# Patient Record
Sex: MALE | Race: OTHER | HISPANIC OR LATINO | URBAN - METROPOLITAN AREA
[De-identification: names, ages, dates, MRNs, and addresses within clinical notes are randomized per-mention and may not be internally consistent; named-entity substitution may affect disease eponyms.]

---

## 2019-11-16 ENCOUNTER — HOSPITAL ENCOUNTER (INPATIENT)
Facility: HOSPITAL | Age: 24
LOS: 1 days | Discharge: HOME | DRG: 208 | End: 2019-11-16
Attending: EMERGENCY MEDICINE | Admitting: EMERGENCY MEDICINE

## 2019-11-16 ENCOUNTER — APPOINTMENT (EMERGENCY)
Dept: RADIOLOGY | Facility: HOSPITAL | Age: 24
DRG: 208 | End: 2019-11-16

## 2019-11-16 VITALS
OXYGEN SATURATION: 99 % | DIASTOLIC BLOOD PRESSURE: 61 MMHG | SYSTOLIC BLOOD PRESSURE: 118 MMHG | HEIGHT: 72 IN | WEIGHT: 140 LBS | TEMPERATURE: 98.8 F | HEART RATE: 88 BPM | RESPIRATION RATE: 11 BRPM | BODY MASS INDEX: 18.96 KG/M2

## 2019-11-16 DIAGNOSIS — R56.9 WITNESSED SEIZURE-LIKE ACTIVITY (HCC): ICD-10-CM

## 2019-11-16 DIAGNOSIS — J96.02 ACUTE RESPIRATORY FAILURE WITH HYPERCAPNIA (HCC): Primary | ICD-10-CM

## 2019-11-16 DIAGNOSIS — F10.929 ACUTE ALCOHOL INTOXICATION (HCC): ICD-10-CM

## 2019-11-16 DIAGNOSIS — E87.4 METABOLIC ACIDOSIS WITH RESPIRATORY ACIDOSIS: ICD-10-CM

## 2019-11-16 DIAGNOSIS — R40.1 OBTUNDATION: ICD-10-CM

## 2019-11-16 PROBLEM — J96.01 ACUTE RESPIRATORY FAILURE WITH HYPOXIA AND HYPERCAPNIA (HCC): Status: RESOLVED | Noted: 2019-11-16 | Resolved: 2019-11-16

## 2019-11-16 PROBLEM — T68.XXXA HYPOTHERMIA: Status: RESOLVED | Noted: 2019-11-16 | Resolved: 2019-11-16

## 2019-11-16 PROBLEM — G93.40 ENCEPHALOPATHY ACUTE: Status: RESOLVED | Noted: 2019-11-16 | Resolved: 2019-11-16

## 2019-11-16 PROBLEM — J96.01 ACUTE RESPIRATORY FAILURE WITH HYPOXIA AND HYPERCAPNIA (HCC): Status: ACTIVE | Noted: 2019-11-16

## 2019-11-16 PROBLEM — G93.40 ENCEPHALOPATHY ACUTE: Status: ACTIVE | Noted: 2019-11-16

## 2019-11-16 PROBLEM — T68.XXXA HYPOTHERMIA: Status: ACTIVE | Noted: 2019-11-16

## 2019-11-16 LAB
ALBUMIN SERPL BCP-MCNC: 4.7 G/DL (ref 3.5–5)
ALP SERPL-CCNC: 106 U/L (ref 46–116)
ALT SERPL W P-5'-P-CCNC: 29 U/L (ref 12–78)
AMMONIA PLAS-SCNC: 41 UMOL/L (ref 11–35)
AMPHETAMINES SERPL QL SCN: NEGATIVE
ANION GAP SERPL CALCULATED.3IONS-SCNC: 7 MMOL/L (ref 4–13)
AST SERPL W P-5'-P-CCNC: 22 U/L (ref 5–45)
ATRIAL RATE: 90 BPM
ATRIAL RATE: 96 BPM
BARBITURATES UR QL: NEGATIVE
BASE EX.OXY STD BLDV CALC-SCNC: 73.9 % (ref 60–80)
BASE EX.OXY STD BLDV CALC-SCNC: 84.6 % (ref 60–80)
BASE EX.OXY STD BLDV CALC-SCNC: 86.1 % (ref 60–80)
BASE EXCESS BLDV CALC-SCNC: -2.7 MMOL/L
BASE EXCESS BLDV CALC-SCNC: -4.7 MMOL/L
BASE EXCESS BLDV CALC-SCNC: -5.5 MMOL/L
BASOPHILS # BLD AUTO: 0.02 THOUSANDS/ΜL (ref 0–0.1)
BASOPHILS NFR BLD AUTO: 0 % (ref 0–1)
BENZODIAZ UR QL: POSITIVE
BILIRUB SERPL-MCNC: 0.25 MG/DL (ref 0.2–1)
BILIRUB UR QL STRIP: NEGATIVE
BUN SERPL-MCNC: 12 MG/DL (ref 5–25)
CALCIUM SERPL-MCNC: 8.2 MG/DL (ref 8.3–10.1)
CHLORIDE SERPL-SCNC: 109 MMOL/L (ref 100–108)
CLARITY UR: NORMAL
CLARITY, POC: CLEAR
CO2 SERPL-SCNC: 26 MMOL/L (ref 21–32)
COCAINE UR QL: NEGATIVE
COLOR UR: YELLOW
COLOR, POC: YELLOW
CREAT SERPL-MCNC: 1.09 MG/DL (ref 0.6–1.3)
EOSINOPHIL # BLD AUTO: 0.01 THOUSAND/ΜL (ref 0–0.61)
EOSINOPHIL NFR BLD AUTO: 0 % (ref 0–6)
ERYTHROCYTE [DISTWIDTH] IN BLOOD BY AUTOMATED COUNT: 12.2 % (ref 11.6–15.1)
ETHANOL SERPL-MCNC: 257 MG/DL (ref 0–3)
GFR SERPL CREATININE-BSD FRML MDRD: 93 ML/MIN/1.73SQ M
GLUCOSE SERPL-MCNC: 120 MG/DL (ref 65–140)
GLUCOSE UR STRIP-MCNC: NEGATIVE MG/DL
HCO3 BLDV-SCNC: 22.1 MMOL/L (ref 24–30)
HCO3 BLDV-SCNC: 24.1 MMOL/L (ref 24–30)
HCO3 BLDV-SCNC: 24.2 MMOL/L (ref 24–30)
HCT VFR BLD AUTO: 44.3 % (ref 36.5–49.3)
HGB BLD-MCNC: 14.5 G/DL (ref 12–17)
HGB UR QL STRIP.AUTO: NEGATIVE
IMM GRANULOCYTES # BLD AUTO: 0.01 THOUSAND/UL (ref 0–0.2)
IMM GRANULOCYTES NFR BLD AUTO: 0 % (ref 0–2)
KETONES UR STRIP-MCNC: NEGATIVE MG/DL
LEUKOCYTE ESTERASE UR QL STRIP: NEGATIVE
LYMPHOCYTES # BLD AUTO: 0.63 THOUSANDS/ΜL (ref 0.6–4.47)
LYMPHOCYTES NFR BLD AUTO: 10 % (ref 14–44)
MCH RBC QN AUTO: 30.7 PG (ref 26.8–34.3)
MCHC RBC AUTO-ENTMCNC: 32.7 G/DL (ref 31.4–37.4)
MCV RBC AUTO: 94 FL (ref 82–98)
METHADONE UR QL: NEGATIVE
MONOCYTES # BLD AUTO: 0.3 THOUSAND/ΜL (ref 0.17–1.22)
MONOCYTES NFR BLD AUTO: 5 % (ref 4–12)
NEUTROPHILS # BLD AUTO: 5.46 THOUSANDS/ΜL (ref 1.85–7.62)
NEUTS SEG NFR BLD AUTO: 85 % (ref 43–75)
NITRITE UR QL STRIP: NEGATIVE
NRBC BLD AUTO-RTO: 0 /100 WBCS
O2 CT BLDV-SCNC: 16.2 ML/DL
O2 CT BLDV-SCNC: 18.5 ML/DL
O2 CT BLDV-SCNC: 19.4 ML/DL
OPIATES UR QL SCN: NEGATIVE
P AXIS: 70 DEGREES
P AXIS: 84 DEGREES
PCO2 BLDV: 49.3 MM HG (ref 42–50)
PCO2 BLDV: 50.5 MM HG (ref 42–50)
PCO2 BLDV: 60.3 MM HG (ref 42–50)
PCP UR QL: NEGATIVE
PH BLDV: 7.22 [PH] (ref 7.3–7.4)
PH BLDV: 7.26 [PH] (ref 7.3–7.4)
PH BLDV: 7.31 [PH] (ref 7.3–7.4)
PH UR STRIP.AUTO: 6 [PH] (ref 4.5–8)
PLATELET # BLD AUTO: 282 THOUSANDS/UL (ref 149–390)
PMV BLD AUTO: 9.9 FL (ref 8.9–12.7)
PO2 BLDV: 49.3 MM HG (ref 35–45)
PO2 BLDV: 56.7 MM HG (ref 35–45)
PO2 BLDV: 65.1 MM HG (ref 35–45)
POTASSIUM SERPL-SCNC: 4.9 MMOL/L (ref 3.5–5.3)
PR INTERVAL: 129 MS
PR INTERVAL: 148 MS
PROT SERPL-MCNC: 8.4 G/DL (ref 6.4–8.2)
PROT UR STRIP-MCNC: NEGATIVE MG/DL
QRS AXIS: 73 DEGREES
QRS AXIS: 83 DEGREES
QRSD INTERVAL: 75 MS
QRSD INTERVAL: 84 MS
QT INTERVAL: 321 MS
QT INTERVAL: 326 MS
QTC INTERVAL: 398 MS
QTC INTERVAL: 406 MS
RBC # BLD AUTO: 4.72 MILLION/UL (ref 3.88–5.62)
SODIUM SERPL-SCNC: 142 MMOL/L (ref 136–145)
SP GR UR STRIP.AUTO: 1.01 (ref 1–1.03)
T WAVE AXIS: 55 DEGREES
T WAVE AXIS: 67 DEGREES
THC UR QL: NEGATIVE
UROBILINOGEN UR QL STRIP.AUTO: 0.2 E.U./DL
VENTRICULAR RATE: 90 BPM
VENTRICULAR RATE: 96 BPM
WBC # BLD AUTO: 6.43 THOUSAND/UL (ref 4.31–10.16)

## 2019-11-16 PROCEDURE — 31500 INSERT EMERGENCY AIRWAY: CPT | Performed by: EMERGENCY MEDICINE

## 2019-11-16 PROCEDURE — 94002 VENT MGMT INPAT INIT DAY: CPT

## 2019-11-16 PROCEDURE — 82805 BLOOD GASES W/O2 SATURATION: CPT | Performed by: EMERGENCY MEDICINE

## 2019-11-16 PROCEDURE — 99236 HOSP IP/OBS SAME DATE HI 85: CPT | Performed by: INTERNAL MEDICINE

## 2019-11-16 PROCEDURE — 94760 N-INVAS EAR/PLS OXIMETRY 1: CPT

## 2019-11-16 PROCEDURE — 80307 DRUG TEST PRSMV CHEM ANLYZR: CPT | Performed by: EMERGENCY MEDICINE

## 2019-11-16 PROCEDURE — 80053 COMPREHEN METABOLIC PANEL: CPT | Performed by: EMERGENCY MEDICINE

## 2019-11-16 PROCEDURE — 99291 CRITICAL CARE FIRST HOUR: CPT

## 2019-11-16 PROCEDURE — 5A1935Z RESPIRATORY VENTILATION, LESS THAN 24 CONSECUTIVE HOURS: ICD-10-PCS | Performed by: EMERGENCY MEDICINE

## 2019-11-16 PROCEDURE — 82140 ASSAY OF AMMONIA: CPT | Performed by: NURSE PRACTITIONER

## 2019-11-16 PROCEDURE — 96366 THER/PROPH/DIAG IV INF ADDON: CPT

## 2019-11-16 PROCEDURE — 85025 COMPLETE CBC W/AUTO DIFF WBC: CPT | Performed by: EMERGENCY MEDICINE

## 2019-11-16 PROCEDURE — 81003 URINALYSIS AUTO W/O SCOPE: CPT

## 2019-11-16 PROCEDURE — 70450 CT HEAD/BRAIN W/O DYE: CPT

## 2019-11-16 PROCEDURE — 36415 COLL VENOUS BLD VENIPUNCTURE: CPT | Performed by: EMERGENCY MEDICINE

## 2019-11-16 PROCEDURE — 96365 THER/PROPH/DIAG IV INF INIT: CPT

## 2019-11-16 PROCEDURE — 0BH17EZ INSERTION OF ENDOTRACHEAL AIRWAY INTO TRACHEA, VIA NATURAL OR ARTIFICIAL OPENING: ICD-10-PCS | Performed by: EMERGENCY MEDICINE

## 2019-11-16 PROCEDURE — 93010 ELECTROCARDIOGRAM REPORT: CPT | Performed by: INTERNAL MEDICINE

## 2019-11-16 PROCEDURE — 99291 CRITICAL CARE FIRST HOUR: CPT | Performed by: EMERGENCY MEDICINE

## 2019-11-16 PROCEDURE — 71045 X-RAY EXAM CHEST 1 VIEW: CPT

## 2019-11-16 PROCEDURE — 80320 DRUG SCREEN QUANTALCOHOLS: CPT | Performed by: EMERGENCY MEDICINE

## 2019-11-16 PROCEDURE — 93005 ELECTROCARDIOGRAM TRACING: CPT

## 2019-11-16 PROCEDURE — NC001 PR NO CHARGE: Performed by: INTERNAL MEDICINE

## 2019-11-16 RX ORDER — THIAMINE MONONITRATE (VIT B1) 100 MG
100 TABLET ORAL DAILY
Status: DISCONTINUED | OUTPATIENT
Start: 2019-11-16 | End: 2019-11-16 | Stop reason: HOSPADM

## 2019-11-16 RX ORDER — PROPOFOL 10 MG/ML
5-50 INJECTION, EMULSION INTRAVENOUS CONTINUOUS
Status: DISCONTINUED | OUTPATIENT
Start: 2019-11-16 | End: 2019-11-16 | Stop reason: DRUGHIGH

## 2019-11-16 RX ORDER — MIDAZOLAM HYDROCHLORIDE 1 MG/ML
3 INJECTION INTRAMUSCULAR; INTRAVENOUS ONCE
Status: COMPLETED | OUTPATIENT
Start: 2019-11-16 | End: 2019-11-16

## 2019-11-16 RX ORDER — FENTANYL CITRATE-0.9 % NACL/PF 10 MCG/ML
50 PLASTIC BAG, INJECTION (ML) INTRAVENOUS CONTINUOUS
Status: DISCONTINUED | OUTPATIENT
Start: 2019-11-16 | End: 2019-11-16

## 2019-11-16 RX ORDER — PROPOFOL 10 MG/ML
5-50 INJECTION, EMULSION INTRAVENOUS CONTINUOUS
Status: DISCONTINUED | OUTPATIENT
Start: 2019-11-16 | End: 2019-11-16

## 2019-11-16 RX ORDER — NALOXONE HYDROCHLORIDE 0.4 MG/ML
INJECTION, SOLUTION INTRAMUSCULAR; INTRAVENOUS; SUBCUTANEOUS
Status: COMPLETED
Start: 2019-11-16 | End: 2019-11-16

## 2019-11-16 RX ORDER — FENTANYL CITRATE 50 UG/ML
INJECTION, SOLUTION INTRAMUSCULAR; INTRAVENOUS
Status: COMPLETED
Start: 2019-11-16 | End: 2019-11-16

## 2019-11-16 RX ORDER — CHLORHEXIDINE GLUCONATE 0.12 MG/ML
15 RINSE ORAL EVERY 12 HOURS SCHEDULED
Status: DISCONTINUED | OUTPATIENT
Start: 2019-11-16 | End: 2019-11-16

## 2019-11-16 RX ORDER — MIDAZOLAM HYDROCHLORIDE 2 MG/2ML
INJECTION, SOLUTION INTRAMUSCULAR; INTRAVENOUS
Status: COMPLETED
Start: 2019-11-16 | End: 2019-11-16

## 2019-11-16 RX ORDER — SODIUM CHLORIDE, SODIUM GLUCONATE, SODIUM ACETATE, POTASSIUM CHLORIDE, MAGNESIUM CHLORIDE, SODIUM PHOSPHATE, DIBASIC, AND POTASSIUM PHOSPHATE .53; .5; .37; .037; .03; .012; .00082 G/100ML; G/100ML; G/100ML; G/100ML; G/100ML; G/100ML; G/100ML
125 INJECTION, SOLUTION INTRAVENOUS CONTINUOUS
Status: DISCONTINUED | OUTPATIENT
Start: 2019-11-16 | End: 2019-11-16 | Stop reason: HOSPADM

## 2019-11-16 RX ORDER — FOLIC ACID 1 MG/1
1 TABLET ORAL DAILY
Status: DISCONTINUED | OUTPATIENT
Start: 2019-11-16 | End: 2019-11-16 | Stop reason: HOSPADM

## 2019-11-16 RX ADMIN — THIAMINE HCL TAB 100 MG 100 MG: 100 TAB at 08:14

## 2019-11-16 RX ADMIN — NALOXONE HYDROCHLORIDE 0.4 MG: 0.4 INJECTION, SOLUTION INTRAMUSCULAR; INTRAVENOUS; SUBCUTANEOUS at 03:46

## 2019-11-16 RX ADMIN — Medication 50 MCG/HR: at 04:43

## 2019-11-16 RX ADMIN — PROPOFOL 30 MCG/KG/MIN: 10 INJECTION, EMULSION INTRAVENOUS at 08:21

## 2019-11-16 RX ADMIN — CHLORHEXIDINE GLUCONATE 0.12% ORAL RINSE 15 ML: 1.2 LIQUID ORAL at 08:14

## 2019-11-16 RX ADMIN — PROPOFOL 30 MCG/KG/MIN: 10 INJECTION, EMULSION INTRAVENOUS at 04:35

## 2019-11-16 RX ADMIN — PROPOFOL 30 MCG/KG/MIN: 10 INJECTION, EMULSION INTRAVENOUS at 04:07

## 2019-11-16 RX ADMIN — FENTANYL CITRATE 50 MCG: 50 INJECTION, SOLUTION INTRAMUSCULAR; INTRAVENOUS at 04:00

## 2019-11-16 RX ADMIN — FOLIC ACID 1 MG: 1 TABLET ORAL at 08:14

## 2019-11-16 RX ADMIN — SODIUM CHLORIDE, SODIUM GLUCONATE, SODIUM ACETATE, POTASSIUM CHLORIDE AND MAGNESIUM CHLORIDE 125 ML/HR: 526; 502; 368; 37; 30 INJECTION, SOLUTION INTRAVENOUS at 07:11

## 2019-11-16 RX ADMIN — ENOXAPARIN SODIUM 40 MG: 40 INJECTION SUBCUTANEOUS at 08:14

## 2019-11-16 NOTE — ED PROVIDER NOTES
History  Chief Complaint   Patient presents with    Altered Mental Status     Per EMS was found unresponsive by friends  Unknown downtime  EMS arrived stating pt was "posturing"  with bilateral eye gaze to the left  Upon arrival to ED pt being ventilated via ambu-bag  GCS 6 upon arrival       Patient is a 28-year-old male with no known medical history who presents to the emergency department for altered mental status  Per patient's friend, the patient and several friends went out to drink  Reportedly patient had 3 beers, but no reported drug use  Patient's girlfriend stated that the patient fell earlier in the evening but otherwise no reports of trauma  Per EMS patient had witnessed seizure-like activity with a leftward gaze, and patient was given Versed by EMS  EMS states that his pupils dilated bilaterally but reactive, but patient's GCS was less than 6  On arrival patient is hypoventilatory, EMS utilizing BVM to maintain adequate respirations  Patient Narcan with no response  Initial vital signs tachycardia, mildly hypertensive with an elevated systolic blood pressure, 959% oxygen saturation  History provided by:  EMS personnel  Altered Mental Status   Presenting symptoms: partial responsiveness and unresponsiveness    Most recent episode: Today  Episode history:  Unable to specify  Timing:  Unable to specify  Progression:  Unable to specify  Context: alcohol use        None       History reviewed  No pertinent past medical history  History reviewed  No pertinent surgical history  History reviewed  No pertinent family history  I have reviewed and agree with the history as documented      Social History     Tobacco Use    Smoking status: Unknown If Ever Smoked   Substance Use Topics    Alcohol use: Not on file     Comment: unknown     Drug use: Not on file     Comment: unknown         Review of Systems   Unable to perform ROS: Patient unresponsive       Physical Exam  ED Triage Vitals Temperature Pulse Respirations Blood Pressure SpO2   11/16/19 0415 11/16/19 0352 11/16/19 0352 11/16/19 0352 11/16/19 0350   (!) 96 6 °F (35 9 °C) 92 12 140/72 100 %      Temp Source Heart Rate Source Patient Position - Orthostatic VS BP Location FiO2 (%)   11/16/19 0415 11/16/19 0352 11/16/19 0352 11/16/19 0352 --   Probe Monitor Lying Right arm       Pain Score       11/16/19 0400       No Pain             Orthostatic Vital Signs  Vitals:    11/16/19 0415 11/16/19 0445 11/16/19 0515 11/16/19 0530   BP: 121/66 127/75 128/67 115/56   Pulse: 94 72 86 82   Patient Position - Orthostatic VS: Lying Lying Lying Lying       Physical Exam   Constitutional: He is oriented to person, place, and time  He appears well-developed and well-nourished  Patient obtunded, emesis on his clothes   HENT:   Head: Normocephalic and atraumatic  Nose: Nose normal    Excessive salivation   Eyes: Pupils are equal, round, and reactive to light  No scleral icterus  Pupils 1mm, sluggish   Neck: No JVD present  No tracheal deviation present  Cardiovascular: Normal rate, regular rhythm, normal heart sounds and intact distal pulses  No murmur heard  Pulmonary/Chest: Breath sounds normal  Apnea and bradypnea noted  Abdominal: Soft  Bowel sounds are normal  He exhibits no distension  There is no tenderness  There is no guarding  Genitourinary: Testes normal and penis normal    Musculoskeletal: Normal range of motion  He exhibits no edema  Neurological: He is oriented to person, place, and time  He is unresponsive  He exhibits normal muscle tone  GCS eye subscore is 1  GCS verbal subscore is 1  GCS motor subscore is 3  No clonus, no rigidity  Normal tone  Patient flexes to painful stimuli in all four extremities  No verbal or eye reponse   Skin: Skin is warm and dry  Capillary refill takes less than 2 seconds  He is not diaphoretic  Psychiatric: He has a normal mood and affect   His behavior is normal    Vitals reviewed  ED Medications  Medications   fentaNYL 1000 mcg in sodium chloride 0 9% 100mL infusion (50 mcg/hr Intravenous New Bag 11/16/19 0443)   propofol (DIPRIVAN) 1000 mg in 100 mL infusion (premix) (30 mcg/kg/min × 63 5 kg Intravenous New Bag 11/16/19 0435)   naloxone (NARCAN) 0 4 mg/mL injection **ADS Override Pull** (0 4 mg Intravenous Given 11/16/19 0346)   midazolam (FOR EMS ONLY) (VERSED) 2 mg/2 mL injection 6 mg (0 mg Does not apply Given to EMS 11/16/19 0359)   fentanyl citrate (PF) 100 MCG/2ML **ADS Override Pull** (50 mcg Intravenous Given 11/16/19 0400)   midazolam (VERSED) 2 mg/2 mL injection **ADS Override Pull** (  Given to EMS 11/16/19 0400)       Diagnostic Studies  Results Reviewed     Procedure Component Value Units Date/Time    Rapid drug screen, urine [[id number]]  (Abnormal) Collected:  11/16/19 0414    Lab Status:  Final result Specimen:  Urine, Other Updated:  11/16/19 0519     Amph/Meth UR Negative     Barbiturate Ur Negative     Benzodiazepine Urine Positive     Cocaine Urine Negative     Methadone Urine Negative     Opiate Urine Negative     PCP Ur Negative     THC Urine Negative    Narrative:       Presumptive report  If requested, specimen will be sent to reference lab for confirmation  FOR MEDICAL PURPOSES ONLY  IF CONFIRMATION NEEDED PLEASE CONTACT THE LAB WITHIN 5 DAYS      Drug Screen Cutoff Levels:  AMPHETAMINE/METHAMPHETAMINES  1000 ng/mL  BARBITURATES     200 ng/mL  BENZODIAZEPINES     200 ng/mL  COCAINE      300 ng/mL  METHADONE      300 ng/mL  OPIATES      300 ng/mL  PHENCYCLIDINE     25 ng/mL  THC       50 ng/mL      Blood gas, venous [[id number]]  (Abnormal) Collected:  11/16/19 0507    Lab Status:  Final result Specimen:  Blood from Arm, Right Updated:  11/16/19 0515     pH, Shivam 7 258     pCO2, Shivam 50 5 mm Hg      pO2, Shivam 65 1 mm Hg      HCO3, Shivam 22 1 mmol/L      Base Excess, Shivam -5 5 mmol/L      O2 Content, Shivam 19 4 ml/dL      O2 HGB, VENOUS 86 1 %     POCT urinalysis dipstick [[id number]]  (Normal) Resulted:  11/16/19 0449    Lab Status:  Final result Updated:  11/16/19 0449     Color, UA yellow     Clarity, UA clear    Urine Macroscopic, POC [[id number]] Collected:  11/16/19 0448    Lab Status:  Final result Specimen:  Urine Updated:  11/16/19 0449     Color, UA Yellow     Clarity, UA Slightly Cloudy     pH, UA 6 0     Leukocytes, UA Negative     Nitrite, UA Negative     Protein, UA Negative mg/dl      Glucose, UA Negative mg/dl      Ketones, UA Negative mg/dl      Urobilinogen, UA 0 2 E U /dl      Bilirubin, UA Negative     Blood, UA Negative     Specific Gravity, UA 1 010    Narrative:       CLINITEK RESULT    Blood gas, venous [[id number]]  (Abnormal) Collected:  11/16/19 0429    Lab Status:  Final result Specimen:  Blood from Arm, Right Updated:  11/16/19 0434     pH, Shivam 7 220     pCO2, Shivam 60 3 mm Hg      pO2, Shivam 49 3 mm Hg      HCO3, Shivam 24 1 mmol/L      Base Excess, Shivam -4 7 mmol/L      O2 Content, Shivam 16 2 ml/dL      O2 HGB, VENOUS 73 9 %     Comprehensive metabolic panel [[id number]]  (Abnormal) Collected:  11/16/19 0402    Lab Status:  Final result Specimen:  Blood from Arm, Right Updated:  11/16/19 0431     Sodium 142 mmol/L      Potassium 4 9 mmol/L      Chloride 109 mmol/L      CO2 26 mmol/L      ANION GAP 7 mmol/L      BUN 12 mg/dL      Creatinine 1 09 mg/dL      Glucose 120 mg/dL      Calcium 8 2 mg/dL      AST 22 U/L      ALT 29 U/L      Alkaline Phosphatase 106 U/L      Total Protein 8 4 g/dL      Albumin 4 7 g/dL      Total Bilirubin 0 25 mg/dL      eGFR 93 ml/min/1 73sq m     Narrative:       [facility] guidelines for Chronic Kidney Disease (CKD):     Stage 1 with normal or high GFR (GFR > 90 mL/min/1 73 square meters)    Stage 2 Mild CKD (GFR = 60-89 mL/min/1 73 square meters)    Stage 3A Moderate CKD (GFR = 45-59 mL/min/1 73 square meters)    Stage 3B Moderate CKD (GFR = 30-44 mL/min/1 73 square meters)    Stage 4 Severe CKD (GFR = 15-29 mL/min/1 73 square meters)    Stage 5 End Stage CKD (GFR <15 mL/min/1 73 square meters)  Note: GFR calculation is accurate only with a steady state creatinine    Ethanol [[id number]]  (Abnormal) Collected:  11/16/19 0402    Lab Status:  Final result Specimen:  Blood from Arm, Right Updated:  11/16/19 0427     Ethanol Lvl 257 mg/dL     CBC and differential [[id number]]  (Abnormal) Collected:  11/16/19 0402    Lab Status:  Final result Specimen:  Blood from Arm, Right Updated:  11/16/19 0411     WBC 6 43 Thousand/uL      RBC 4 72 Million/uL      Hemoglobin 14 5 g/dL      Hematocrit 44 3 %      MCV 94 fL      MCH 30 7 pg      MCHC 32 7 g/dL      RDW 12 2 %      MPV 9 9 fL      Platelets 788 Thousands/uL      nRBC 0 /100 WBCs      Neutrophils Relative 85 %      Immat GRANS % 0 %      Lymphocytes Relative 10 %      Monocytes Relative 5 %      Eosinophils Relative 0 %      Basophils Relative 0 %      Neutrophils Absolute 5 46 Thousands/µL      Immature Grans Absolute 0 01 Thousand/uL      Lymphocytes Absolute 0 63 Thousands/µL      Monocytes Absolute 0 30 Thousand/µL      Eosinophils Absolute 0 01 Thousand/µL      Basophils Absolute 0 02 Thousands/µL                  CT head without contrast   Final Result by Sandra Werner DO (11/16 9413)   No acute intracranial abnormality  Workstation performed: [id number]         XR chest 1 view portable   ED Interpretation by Aliyn Arvizu DO (11/16 7830)   Satisfactory ET tube placement   No acute cardiopulmonary processes            Procedures  ECG 12 Lead Documentation Only  Date/Time: 11/16/2019 4:01 AM  Performed by: Ailyn Arvizu DO  Authorized by: Ailyn Arvizu DO     ECG reviewed by me, the ED Provider: yes    Patient location:  ED  Previous ECG:     Previous ECG:  Unavailable  Interpretation:     Interpretation: normal    Rate:     ECG rate:  90    ECG rate assessment: normal    Rhythm:     Rhythm: sinus rhythm    Ectopy: Ectopy: none    QRS:     QRS axis:  Normal    QRS intervals:  Normal  Conduction:     Conduction: normal    ST segments:     ST segments:  Normal  T waves:     T waves: normal    Other findings:     Other findings: early repolarization    Intubation  Date/Time: 11/16/2019 4:02 AM  Performed by: Viridiana Camara DO  Authorized by: Viridiana Camara DO     Patient location:  ED  Consent:     Consent obtained:  Emergent situation  Pre-procedure details:     Patient status:  Unresponsive    Pretreatment medications:  Etomidate    Paralytics:  Succinylcholine  Indications:     Indications for intubation: respiratory failure and airway protection    Procedure details:     Preoxygenation:  Bag valve mask    CPR in progress: no      Intubation method:  Oral    Oral intubation technique:  Direct    Laryngoscope blade: Mac 3    Tube size (mm):  8 0    Tube type:  Cuffed    Number of attempts:  1    Tube visualized through cords: yes    Placement assessment:     Tube secured with:  ETT palomares    Breath sounds:  Equal and absent over the epigastrium    Placement verification: chest rise, condensation, CXR verification, direct visualization, equal breath sounds, ETCO2 detector and tube exhalation      CXR findings:  ETT in proper place  Post-procedure details:     Patient tolerance of procedure: Tolerated well, no immediate complications            ED Course                               MDM  Number of Diagnoses or Management Options  Acute alcohol intoxication ([facility] [address] ): new and requires workup  Acute respiratory failure with hypercapnia ([facility] [address] ): new and requires workup  Metabolic acidosis with respiratory acidosis: new and requires workup  Obtundation: new and requires workup  Witnessed seizure-like activity [facility]): new and requires workup  Diagnosis management comments: 47-year-old male with no known medical history  Blood glucose >100   Differential diagnosis includes toxins, hypoxia/CO2 narcosis, seizure, primary CNS/trauma, acute toxic metabolic encephalopathy  Intubated for acute respiratory failure with hypercapnia secondary to apnea/bradypnea  EKG sinus rhythm, benign early repolarization  Mildly hypothermic with a temp 96 1°, Melany hugger, warm blankets for warming  CT no acute intracranial processes  Patient does have excessive salivation/emesis as well however, no bradycardia, pupils initially dilated, does not appear bronchospastic on exam  Possibly a combination of EtOH/Versed, CO2 narcosis, +/- other toxins not evaluated on urine drug screen, seizure  Admit to critical care            Amount and/or Complexity of Data Reviewed  Clinical lab tests: ordered and reviewed  Tests in the radiology section of CPT®: ordered and reviewed  Decide to obtain previous medical records or to obtain history from someone other than the patient: yes  Obtain history from someone other than the patient: yes  Review and summarize past medical records: yes  Independent visualization of images, tracings, or specimens: yes        Disposition  Final diagnoses:   Acute alcohol intoxication ([address] )   Metabolic acidosis with respiratory acidosis   Witnessed seizure-like activity ([facility] [address] )   Obtundation   Acute respiratory failure with hypercapnia ([address] )     Time reflects when diagnosis was documented in both MDM as applicable and the Disposition within this note     Time User Action Codes Description Comment    11/16/2019  5:21 AM Marcellus Blare Add [F10 929] Acute alcohol intoxication ([address] )     11/16/2019  5:21 AM Marcellus Blare Add [G18 4] Metabolic acidosis with respiratory acidosis     11/16/2019  5:21 AM Marcellus Blare Add [R56 9] Witnessed seizure-like activity ([address] )     11/16/2019  5:21 AM Marcellus Blare Add [R40 1] Obtundation     11/16/2019  5:30 AM Marcellus Blare Add [J96 02] Acute respiratory failure with hypercapnia ([address] )     11/16/2019  5:30 AM Marcellus Blare Modify [F10 929] Acute alcohol intoxication ([address] )     11/16/2019  5:30 AM Han De Oliveira Zen Donohue [J80 86] Acute respiratory failure with hypercapnia [facility])       ED Disposition     ED Disposition Condition Date/Time Comment    Admit Stable Sat Nov 16, 2019  5:21 AM Case was discussed with critical care and the patient's admission status was agreed to be Admission Status: inpatient status to the service of Dr Aidan Mendez   Follow-up Information    None         Patient's Medications    No medications on file     No discharge procedures on file  ED Provider  Attending physically available and evaluated Yanet Bryan I managed the patient along with the ED Attending      Electronically Signed by         Britt Beltran DO  11/16/19 1457

## 2019-11-16 NOTE — RESPIRATORY THERAPY NOTE
RT Ventilator Management Note  Branden Martin Roles 30 y o  male MRN: [id number]  Unit/Bed#: [facility] 12 Encounter: [id number]      Daily Screen     No data found in the last 10 encounters  Physical Exam:   Assessment Type: Assess only  Bilateral Breath Sounds: Clear      Resp Comments: Pt  resting comfortably on vent  BS clear no rx needed at this time  Will continue to monitor pt

## 2019-11-16 NOTE — H&P
H&P Exam - Critical Care   Branden Sharif 30 y o  male MRN: [id number]  Unit/Bed#: ED 20 Encounter: [id number]      -------------------------------------------------------------------------------------------------------------  Chief Complaint:   Patient unable to provide secondary to intubation and sedation    History of Present Illness   HX and PE limited by: Altered mental status, intubation sedation  Vidya Mccartney is a 30 y o  male with no significant past medical history that was able to be identified by review care everywhere records  Per ED documentation the patient was found unresponsive by friends with unclear down time on arrival by EMS the patient was described as having posturing and movements with bilateral eye gaze to the left and emesis on his clothes  He was hypoventilating and was supported with Ambu bag ventilation GCS 6 upon arrival   Per the emergency department records the patient's girlfriend and friend stated that the patient had a fall earlier in the day and then when out with several friends to drink, they reported he had 3 beers  Patient received Versed pre-hospital for questionable seizure-like activity  He also received Narcan with no response  The patient was intubated in the emergency department for airway protection and is being admitted to the critical care unit  Patient's medical alcohol level was 257 in the emergency department and UDS was positive for benzodiazepines  CT head showed no acute intracranial abnormality    History obtained from chart review    -------------------------------------------------------------------------------------------------------------  Assessment and Plan:  Plan:  Neuro:  Diagnosis:  Encephalopathy, acute alcohol intoxication, possible seizure like activity witnessed by EMS  Plan:  · Management of PAD  · Analgesia  · Sedation plan: Fentanyl 50 mcg/min and Propofol 40 mcg/kg/min  · RASS goal: -1 Drowsy and 0 Alert and Calm  · Delirium monitoring/management  · Regulate Sleep-wake cycle/CAM-ICU daily  · Daily SAT  · Serial Neuro Exams  · Monitor for alcohol withdrawal syndrome, thiamine and folate  · Can consider tox consultation if continued encephalopathy  · Hold on EEG at this time  Cardiac  Diagnosis: No acute issues  Plan:  · Rhythm: NSR  · Follow rhythm on telemetry  Pulmonary  Diagnosis:  Acute hypoxemic and hypercapnic respiratory failure, intubation for airway protection, possible aspiration event  Plan:  · Assess daily readiness to extubate: SBT in coordination with SAT  · SBT plan: assess for extubation today  · Chlorhexidine ordered: yes  · Pulmonary toileting  Gastrointestinal  Diagnosis: no acute issues  Plan:  · Bowel regimen: none at this time   · Omeprazole for GI prophylaxis   FEN  Diagnosis: No acute issues  Plan:  · Fluid/Diuretic plan: Isolyte at 125/hr while NPO  · Goal 24 hour fluid balance: net positive  · Nutrition/diet plan: NPO  · Replete electrolytes with goals: K >4 0, Mag >2 0, and Phos >3 0  Genitourinary  Diagnosis: No acute issues  Plan:  · Indwelling Schuler present: yes   · Trend UOP and BUN/creat  · Strict I and O  Infectious Diease  Diagnosis:  Hypothermia aspiration event however no fever and no infiltrate on chest x-ray at this time  Plan:  · Abx ordered: none  · Continue monitor off antibiotics  · Trend temps and WBC count  Heme:   Diagnosis:  No acute issues  Plan:  · Trend hgb and plts  · Transfuse as needed for goal hgb >7  Endo:   Diagnosis:  No acute issues  Plan:  · Goal -180mg/dL  MSK/Skin:  · Early Mobility/Exercise  · PT consult: not applicable  · OT consult: not applicable  · Turn and position patient Q2 hours  · Off load pressure points  · Allevyn preventative per protocol  Family:  · Case management consult to identify next of kin    Disposition: Continue ICU care    Code Status: Level 1 - Full Code  --------------------------------------------------------------------------------------------------------------  Review of Systems   Unable to perform ROS: Intubated       Physical Exam   Constitutional: He appears lethargic  He is sedated, intubated and restrained  HENT:   Head: Normocephalic and atraumatic  Excessive salivation   Eyes:       Neck: No JVD present  Cardiovascular: Normal rate, regular rhythm and normal heart sounds  Pulses:       Radial pulses are 2+ on the right side, and 2+ on the left side  Dorsalis pedis pulses are 1+ on the right side, and 1+ on the left side  Pulmonary/Chest: Effort normal and breath sounds normal  He is intubated  No respiratory distress  Abdominal: Soft  Normal appearance and bowel sounds are normal  There is no tenderness  Genitourinary:   Genitourinary Comments: Schuler to gravity   Neurological: He appears lethargic      --------------------------------------------------------------------------------------------------------------  Historical Information   History reviewed  No pertinent past medical history  History reviewed  No pertinent surgical history  Social History   Social History     Substance and Sexual Activity   Alcohol Use Not on file    Comment: unknown      Social History     Substance and Sexual Activity   Drug Use Not on file    Comment: unknown      Social History     Tobacco Use   Smoking Status Unknown If Ever Smoked     Exercise History: unable to obatin  Family History:   History reviewed  No pertinent family history    Family history unknown    Vitals:   Vitals:    11/16/19 0545 11/16/19 0600 11/16/19 0615 11/16/19 0630   BP: 113/58 117/67 117/67 116/63   BP Location: Right arm Right arm Right arm Right arm   Pulse: 80 78 94 80   Resp: 12 12 16 12   Temp: 98 2 °F (36 8 °C) 97 9 °F (36 6 °C) 98 2 °F (36 8 °C) 98 6 °F (37 °C)   TempSrc:       SpO2: 100% 100% 100% 100%   Weight:       Height:         Temp  Min: 96 6 °F (35 9 °C) Max: 98 6 °F (37 °C)  IBW: 77 6 kg  Height: 6' (182 9 cm)  Body mass index is 18 99 kg/m²  N/A    Medications:  Current Facility-Administered Medications   Medication Dose Route Frequency    chlorhexidine (PERIDEX) 0 12 % oral rinse 15 mL  15 mL Swish [employer] Spit Q12H [address]    enoxaparin (LOVENOX) subcutaneous injection 40 mg  40 mg Subcutaneous Daily    fentaNYL 1000 mcg in sodium chloride 0 9% 100mL infusion  50 mcg/hr Intravenous Continuous    multi-electrolyte (PLASMALYTE-A/ISOLYTE-S PH 7 4) IV solution  125 mL/hr Intravenous Continuous    omeprazole (PRILOSEC) suspension 2 mg/mL  20 mg Oral Daily    propofol (DIPRIVAN) 1000 mg in 100 mL infusion (premix)  5-50 mcg/kg/min Intravenous Continuous     Home medications:  None     Allergies:  Not on File      Laboratory and Diagnostics:  Results from last 7 days   Lab Units 11/16/19  0402   WBC Thousand/uL 6 43   HEMOGLOBIN g/dL 14 5   HEMATOCRIT % 44 3   PLATELETS Thousands/uL 282   NEUTROS PCT % 85*   MONOS PCT % 5     Results from last 7 days   Lab Units 11/16/19  0402   SODIUM mmol/L 142   POTASSIUM mmol/L 4 9   CHLORIDE mmol/L 109*   CO2 mmol/L 26   ANION GAP mmol/L 7   BUN mg/dL 12   CREATININE mg/dL 1 09   CALCIUM mg/dL 8 2*   GLUCOSE RANDOM mg/dL 120   ALT U/L 29   AST U/L 22   ALK PHOS U/L 106   ALBUMIN g/dL 4 7   TOTAL BILIRUBIN mg/dL 0 25                       ABG:    VBG:  Results from last 7 days   Lab Units 11/16/19  0507   PH TAMARA  7 258*   PCO2 TAMARA mm Hg 50 5*   PO2 TAMARA mm Hg 65 1*   HCO3 TAMARA mmol/L 22 1*   BASE EXC TAMARA mmol/L -5 5           Micro:          EKG: pending  Imaging: I have personally reviewed pertinent reports     and I have personally reviewed pertinent films in PACS   Chest x-ray reviewed, ET tube 4 cm above frankie, will advance 1 cm, no obvious infiltrate or pneumothorax  CT head with no acute pathology    ------------------------------------------------------------------------------------------------------------  Advance Directive and Living Will:      Power of :    POLST:    ------------------------------------------------------------------------------------------------------------  Anticipated Length of Stay is > 2 midnights    Counseling / Coordination of Care  36 min of critical care time, excludes procedures      RADHA Yung        Portions of the record may have been created with voice recognition software  Occasional wrong word or "sound a like" substitutions may have occurred due to the inherent limitations of voice recognition software    Read the chart carefully and recognize, using context, where substitutions have occurred

## 2019-11-16 NOTE — SOCIAL WORK
CM informed pt medically stable for d/c to home today  Per bedside RN, pt to be d/c to home with supportive parents  CM met with pt and parents to offer HOST services  Pt declines HOST at this time  No other needs identified upon d/c

## 2019-11-16 NOTE — ED NOTES
proprofol drip started at 30mcg/kg/min based on body weight of 64kg at this time     Monie James  11/16/19 7866

## 2019-11-16 NOTE — SOCIAL WORK
CM informed pt medically stable for d/c  CM spoke with bedside RN re: pt's d/c/HOST  Per bedside RN, pt will return home with supportive parents--would not be interested in HOST at this time

## 2019-11-16 NOTE — RESPIRATORY THERAPY NOTE
RT Protocol Note  Brnaden Briones 30 y o  male MRN: [id number]  Unit/Bed#: ED 20 Encounter: [id number]    Assessment    Active Problems:    * No active hospital problems  *      Home Pulmonary Medications:         History reviewed  No pertinent past medical history  Social History     Socioeconomic History    Marital status: Unknown     Spouse name: None    Number of children: None    Years of education: None    Highest education level: None   Occupational History    None   Social Needs    Financial resource strain: None    Food insecurity:     Worry: None     Inability: None    Transportation needs:     Medical: None     Non-medical: None   Tobacco Use    Smoking status: Unknown If Ever Smoked   Substance and Sexual Activity    Alcohol use: None     Comment: unknown     Drug use: None     Comment: unknown     Sexual activity: None   Lifestyle    Physical activity:     Days per week: None     Minutes per session: None    Stress: None   Relationships    Social connections:     Talks on phone: None     Gets together: None     Attends Yazdanism service: None     Active member of club or organization: None     Attends meetings of clubs or organizations: None     Relationship status: None    Intimate partner violence:     Fear of current or ex partner: None     Emotionally abused: None     Physically abused: None     Forced sexual activity: None   Other Topics Concern    None   Social History Narrative    None       Subjective         Objective    Physical Exam:   Assessment Type: (P) Assess only  Bilateral Breath Sounds: (P) Clear    Vitals:  Blood pressure 116/63, pulse 80, temperature 98 6 °F (37 °C), resp  rate 12, height 6' (1 829 m), weight 63 5 kg (140 lb), SpO2 100 %  Imaging and other studies: I have personally reviewed pertinent reports  Plan    Respiratory Plan: (P) No distress/Pulmonary history        Resp Comments: (P) Pt  evaluated for respiratory protocol   BS= clear and well aerated  Pt  in no distress on A/C  OpP7=739%  Pt  has no hx of respiratory med use  No indication for respiratory intervention at this time  Will continue to monitor and titrate care accordingly

## 2019-11-16 NOTE — ED NOTES
Patient responding well to [facility] at this time; maintaining a temperature within defined limits; Melany Dumont currently set at [facility]   Dr Meenu Lindo aware of patient responding to warming measures      Shannon Mckee RN  11/16/19 166 TRES Helms RN  11/16/19 8238

## 2019-11-16 NOTE — UTILIZATION REVIEW
Notification of Inpatient Admission/Inpatient Authorization Request   This is a Notification of Inpatient Admission for [address]  Be advised that this patient was admitted to our facility under Inpatient Status  Contact Merlin Suero at [phone] for additional admission information  [facility]  DEDICATED -[phone]  Patient Name:   Corrine Sanchez   YOB: 1995       [address]   [address]:   [address]  Tax ID: [id number]  NPI: [id number] Attending Provider/NPI: Cam Medina [[id number]]   Place of Service Code: 24     Place of Service Name:  [address] [place] [address]   Start Date: 11/16/19 7399     Discharge Date & Time: No discharge date for patient encounter  Type of Admission: Inpatient Status Discharge Disposition (if discharged): Final discharge disposition not confirmed   Patient Diagnoses: Alcohol intoxication ([address] ) [F10 929]  Obtundation [R40 1]  Acute alcohol intoxication ([address] ) [T62 283]  Metabolic acidosis with respiratory acidosis [E87 4]  Acute respiratory failure with hypercapnia ([address] ) [J96 02]  Witnessed seizure-like activity ([address] ) [R56 9]     Orders: Admission Orders (From admission, onward)     Ordered        11/16/19 0618  Inpatient Admission  Once                    Assigned Utilization Review Contact: Merlin Suero  Utilization   Network Utilization Review Department  Phone: [phone]; Fax [phone]  Email: [email or web address]   ATTENTION PAYERS: Please call the assigned Utilization  directly with any questions or concerns ALL voicemails in the department are confidential  Send all requests for admission clinical reviews, approved or denied determinations and any other requests to dedicated fax number belonging to the campus where the patient is receiving treatment

## 2019-11-16 NOTE — RESPIRATORY THERAPY NOTE
resp care      11/16/19 5038   Respiratory Assessment   Resp Comments pt extubated to room air, no distress noted, pt awake, alert, following commands, not requiriing any 02   O2 Device room air

## 2019-11-16 NOTE — ED NOTES
Patient back from CT Patient slowly responding to warm blankets; Melany Dumont to be placed     Jimy Wilhelm RN  11/16/19 9935

## 2019-11-16 NOTE — ED ATTENDING ATTESTATION
11/16/2019  I, Miley Arana MD, saw and evaluated the patient  I have discussed the patient with the resident/non-physician practitioner and agree with the resident's/non-physician practitioner's findings, Plan of Care, and MDM as documented in the resident's/non-physician practitioner's note, except where noted  All available labs and Radiology studies were reviewed  I was present for key portions of any procedure(s) performed by the resident/non-physician practitioner and I was immediately available to provide assistance  At this point I agree with the current assessment done in the Emergency Department  I have conducted an independent evaluation of this patient a history and physical is as follows:    ED Course     Emergency Department Note- Tariq Mayers 30 y o  male MRN: [id number]    Unit/Bed#: ED 20 Encounter: [id number]    Tariq Mayers is a 30 y o  male who presents with   Chief Complaint   Patient presents with    Altered Mental Status     Per EMS was found unresponsive by friends  Unknown downtime  EMS arrived stating pt was "posturing"  with bilateral eye gaze to the left  Upon arrival to ED pt being ventilated via ambu-bag  GCS 5 upon arrival           History of Present Illness   HPI:  Tariq Mayers is a 30 y o  male who presents for evaluation of:  Delirium after drinking ETOH earlier this evening  Patient presents from EMS obtunded and unresponsive  Review of Systems   Unable to perform ROS: Patient unresponsive (secondary to alcohol induced delirium )       Historical Information   History reviewed  No pertinent past medical history  History reviewed  No pertinent surgical history    Social History   Social History     Substance and Sexual Activity   Alcohol Use Not on file     Social History     Substance and Sexual Activity   Drug Use Not on file     Social History     Tobacco Use   Smoking Status Not on file     Family History: non-contributory    Meds/Allergies   all medications and allergies reviewed  Not on File    Objective   First Vitals:   Pulse: 92 (19 035)  SpO2: 100 % (19 035)    Current Vitals:   Pulse: 92 (19 035)  SpO2: 100 % (19)    No intake or output data in the 24 hours ending 19    Invasive Devices     Peripheral Intravenous Line            Peripheral IV 19 Left Antecubital less than 1 day    Peripheral IV 19 Left Hand less than 1 day                Physical Exam   Constitutional: He appears toxic (delirious)  He appears ill  HENT:   Head: Normocephalic and atraumatic  Mouth/Throat: Oropharynx is clear and moist    Eyes: Right eye exhibits normal extraocular motion and no nystagmus  Left eye exhibits normal extraocular motion and no nystagmus  Right pupil is round and reactive  Left pupil is round and reactive  Cardiovascular: Normal rate and intact distal pulses  Pulmonary/Chest: Effort normal  No respiratory distress  Abdominal: Soft  Bowel sounds are normal    Neurological: He is alert  Skin: Skin is warm  Psychiatric: He is agitated  He is noncommunicative  He is inattentive  Nursing note and vitals reviewed  Medical Decision Makin  Acute delirium secondary to alcohol and or illict drug abuse: patient intubated on arrival      No results found for this or any previous visit (from the past 39 hour(s))  XR chest 1 view portable    (Results Pending)   CT head without contrast    (Results Pending)         Portions of the record may have been created with voice recognition software  Occasional wrong word or "sound a like" substitutions may have occurred due to the inherent limitations of voice recognition software  Read the chart carefully and recognize, using context, where substitutions have occurred          Critical Care Time  CriticalCare Time  Performed by: Randy Childs MD  Authorized by: Randy Childs MD     Critical care provider statement:     Critical care time (minutes):  31 Critical care time was exclusive of:  Separately billable procedures and treating other patients and teaching time    Critical care was necessary to treat or prevent imminent or life-threatening deterioration of the following conditions:  Respiratory failure    Critical care was time spent personally by me on the following activities:  Blood draw for specimens, obtaining history from patient or surrogate, development of treatment plan with patient or surrogate, discussions with consultants, discussions with primary provider, examination of patient, interpretation of cardiac output measurements, ordering and performing treatments and interventions, ordering and review of laboratory studies, ordering and review of radiographic studies, re-evaluation of patient's condition, review of old charts and ventilator management    I assumed direction of critical care for this patient from another provider in my specialty: no    Comments:      Patient intubated on arrival for delirium and AW protection    Care of patient discussed with on call intensive care unit attending who will accept the patient for further treatment of delirium

## 2019-11-16 NOTE — RESPIRATORY THERAPY NOTE
RT Ventilator Management Note  Ac Steve 30 y o  male MRN: [id number]  Unit/Bed#: [facility] 12 Encounter: [id number]      Daily Screen       11/16/2019  0805             Patient safety screen outcome[de-identified]  Failed  (Pended)     Not Ready for Weaning due to[de-identified]  Underline problem not resolved  (Pended)             Physical Exam:   Assessment Type: Assess only  Bilateral Breath Sounds: Clear      Resp Comments: Pt  resting comfortably on vent  BS clear no rx needed at this time  Will continue to monitor pt

## 2019-11-16 NOTE — DISCHARGE SUMMARY
Discharge Summary - Dory Cross 30 y o  male MRN: [id number]    [facility]: [id number]    Admission Date:   Admission Orders (From admission, onward)     Ordered        11/16/19 0618  Inpatient Admission  Once                     Admitting Diagnosis: Alcohol intoxication ([address] ) [F10 929]  Obtundation [R40 1]  Acute alcohol intoxication ([address] ) [Q85 803]  Metabolic acidosis with respiratory acidosis [E87 4]  Acute respiratory failure with hypercapnia ([address] ) [J96 02]  Witnessed seizure-like activity ([address] ) [R56 9]    HPI:   Dory Cross is a 30 y o  male with no significant past medical history that was able to be identified by review care everywhere records  Per ED documentation the patient was found unresponsive by friends with unclear down time on arrival by EMS the patient was described as having posturing and movements with bilateral eye gaze to the left and emesis on his clothes  He was hypoventilating and was supported with Ambu bag ventilation GCS 6 upon arrival   Per the emergency department records the patient's girlfriend and friend stated that the patient had a fall earlier in the day and then when out with several friends to drink, they reported he had 3 beers  Patient received Versed pre-hospital for questionable seizure-like activity  He also received Narcan with no response  The patient was intubated in the emergency department for airway protection and is being admitted to the critical care unit      Patient's medical alcohol level was 257 in the emergency department and UDS was positive for benzodiazepines  CT head showed no acute intracranial abnormality    Procedures Performed:   Orders Placed This Encounter   Procedures    Critical Care    ED ECG Documentation Only    ED ECG Documentation Only    Intubation       Summary of Hospital Course:   Pt admitted to MICU secondary to Encephalopathy and acute respiratory failure   Suspect that seizure like activity was secondary to hypoxemia and hypercarbia  Pt extubated approx 11am to room air and has been doing well  Patient counseled on the risks and consequences of alcohol abuse with liver dysfunction, aspiration and low oxygen levels  Discharge Diagnosis:     Resolved Problems  Date Reviewed: 11/16/2019          Resolved    Encephalopathy acute 11/16/2019     Resolved by  RADHA Yung    * (Principal) Acute respiratory failure with hypoxia and hypercapnia ([facility] ) 11/16/2019     Resolved by  Morena Wagoner, [address] activity ([facility] [address] ) 11/16/2019     Resolved by  Morena Wagoner, [address]    Hypothermia 11/16/2019     Resolved by  RADHA Yung          Condition at Discharge: stable         Discharge instructions/Information to patient and family:   See after visit summary for information provided to patient and family  Provisions for Follow-Up Care:  See after visit summary for information related to follow-up care and any pertinent home health orders  PCP: No primary care provider on file  Disposition: Home    Planned Readmission: No      Discharge Statement   I spent 10 minutes discharging the patient  This time was spent on the day of discharge  I had direct contact with the patient on the day of discharge  Additional documentation is required if more than 30 minutes were spent on discharge  Discharge Medications:  See after visit summary for reconciled discharge medications provided to patient and family

## 2019-11-16 NOTE — ED PROCEDURE NOTE
PROCEDURE  ECG 12 Lead Documentation Only  Date/Time: 11/16/2019 3:52 AM  Performed by: Maverick Sage MD  Authorized by: Maverick Sage MD     Indications / Diagnosis:  Delirium  Patient location:  ED  Previous ECG:     Previous ECG:  Unavailable  Interpretation:     Interpretation: normal    Rate:     ECG rate:  90    ECG rate assessment: normal    Rhythm:     Rhythm: sinus rhythm    Ectopy:     Ectopy: none    QRS:     QRS axis:  Normal  Conduction:     Conduction: normal    ST segments:     ST segments:  Normal  T waves:     T waves: normal           Maverick Sage MD  11/16/19 5980

## 2019-11-16 NOTE — PROGRESS NOTES
Progress Note - Liliana Quintanilla 1995, 30 y o  male MRN: [id number]    [facility]: [id number]    Primary Care Provider: No primary care provider on file  Date and time admitted to hospital: 11/16/2019  3:41 AM        Alcohol intoxication ([address] )  Assessment & Plan  Counseling provided  Monitor for MARIAJOSE        Code Status: Level 1 - Full Code  POA:    POLST:      Reason for ICU admission:   Encephalopathy, alcohol intoxication    Active problems:   Principal Problem (Resolved):    Acute respiratory failure with hypoxia and hypercapnia ([address] )  Active Problems:    Alcohol intoxication ([address] )  Resolved Problems:    Encephalopathy acute    Seizure-like activity ([address] )    Hypothermia      Consultants:   None    History of Present Illness/ Summary of clinical course  Liliana Quintanilla is a 30 y o  male with no significant past medical history that was able to be identified by review care everywhere records  Per ED documentation the patient was found unresponsive by friends with unclear down time on arrival by EMS the patient was described as having posturing and movements with bilateral eye gaze to the left and emesis on his clothes  He was hypoventilating and was supported with Ambu bag ventilation GCS 6 upon arrival   Per the emergency department records the patient's girlfriend and friend stated that the patient had a fall earlier in the day and then when out with several friends to drink, they reported he had 3 beers  Patient received Versed pre-hospital for questionable seizure-like activity  He also received Narcan with no response  The patient was intubated in the emergency department for airway protection and is being admitted to the critical care unit      Patient's medical alcohol level was 257 in the emergency department and UDS was positive for benzodiazepines  CT head showed no acute intracranial abnormality  Pt was re-evaluated in ICU and found to meet criteria for extubation   He was extubated to Room air without difficulty  Friends of patient report that he had multiple beers, whiskey, vodka, and gin  Pt's parents at bedside  Patient counseled on excessive alcohol consumption  Pt denied any intentional overdose, risk of stress or attempt to harm self  Recent or scheduled procedures:   None    Outstanding/pending diagnostics:   None    Cultures:   None       Mobilization Plan:   OOB  Nutrition Plan:   Regular diet    VTE Pharmacologic Prophylaxis: Enoxaparin (Lovenox)  VTE Mechanical Prophylaxis: sequential compression device    Discharge Plan:   Patient should be ready for discharge home 11/16 or 11/17    Initial Physical Therapy Recommendations: n/a  Initial Occupational Therapy Recommendations: n/a  Initial /Plan: n/a    Home medications that are not reordered and reason why:   n/a      Spoke with Dr Alannah Ahn regarding transfer  Please call [facility] attending with any questions or concerns  Portions of the record may have been created with voice recognition software  Occasional wrong word or "sound a like" substitutions may have occurred due to the inherent limitations of voice recognition software  Read the chart carefully and recognize, using context, where substitutions have occurred

## 2019-11-18 NOTE — UTILIZATION REVIEW
Initial Clinical Review    Admission: Date/Time/Statement: Inpatient Admission Orders (From admission, onward)     Ordered        11/16/19 0618  Inpatient Admission  Once                   Orders Placed This Encounter   Procedures    Inpatient Admission     Standing Status:   Standing     Number of Occurrences:   1     Order Specific Question:   Admitting Physician     Answer:   Jose M Mai     Order Specific Question:   Level of Care     Answer:   Critical Care [15]     Order Specific Question:   Estimated length of stay     Answer:   More than 2 Midnights     Order Specific Question:   Certification     Answer:   I certify that inpatient services are medically necessary for this patient for a duration of greater than two midnights  See H&P and MD Progress Notes for additional information about the patient's course of treatment  ED Arrival Information     Expected Arrival Acuity Means of Arrival Escorted By Service Admission Type    - 11/16/2019 03:41 Immediate Ambulance [facility] EMS Hospitalist Emergency    Arrival Complaint    Unresponsive        Chief Complaint   Patient presents with    Altered Mental Status     Per EMS was found unresponsive by friends  Unknown downtime  EMS arrived stating pt was "posturing"  with bilateral eye gaze to the left  Upon arrival to ED pt being ventilated via ambu-bag  GCS 6 upon arrival       Assessment/Plan:  30 y o  male with no significant PMHx per records presented to after being found unresponsive by friends with unclear down time on arrival by EMS  The patient was described as having posturing and movements with bilateral eye gaze to the left and emesis on his clothes  He was hypoventilating and was supported with Ambubag ventilation GCS 6 upon arrival   Per the ED records the patient's girlfriend and friend stated that the patient had a fall earlier in the day and then when out with several friends to drink, they reported he had 3 beers  Patient received Versed pre-hospital for questionable seizure-like activity  He also received Narcan with no response  ETOH level was 257, UDS (+) benzos and the patient was intubated in the ED for airway protection  Admit inpatient ICU with Encephalopathy, acute alcohol intoxication, possible seizure like activity witnessed by EMS   Continue Vent extubation protocol, tele monitoring, serial neuro exams, CIWA monitoring, thiamine, folate, IVF, I/O's, arvizu cath, I/O's, WBC    11/16 @ 0937 pt extubated to RA, alban reg diet    ED Triage Vitals   Temperature Pulse Respirations Blood Pressure SpO2   11/16/19 0415 11/16/19 0352 11/16/19 0352 11/16/19 0352 11/16/19 0350   (!) 96 6 °F (35 9 °C) 92 12 140/72 100 %      Temp Source Heart Rate Source Patient Position - Orthostatic VS BP Location FiO2 (%)   11/16/19 0415 11/16/19 0352 11/16/19 0352 11/16/19 0352 --   Probe Monitor Lying Right arm       Pain Score       11/16/19 0400       No Pain        Wt Readings from Last 1 Encounters:   11/16/19 63 5 kg (140 lb)     Additional Vital Signs:   Date/Time  Temp  Pulse  Resp  BP  MAP (mmHg)  SpO2  O2 Device   11/16/19 1200  98 8 °F (37 1 °C)  88    118/61  80  99 %     11/16/19 1100    78    110/55  74  95 %     11/16/19 0940  99 3 °F (37 4 °C)               11/16/19 0937              None (Room air)   11/16/19 0900  100 °F (37 8 °C)  76  11Abnormal   115/75  88  100 %     11/16/19 0800  99 7 °F (37 6 °C)  74  12  114/58  77  100 %     11/16/19 0730  100 °F (37 8 °C)               11/16/19 0711  99 7 °F (37 6 °C)  100  28Abnormal   145/82  101  100 %     11/16/19 0645  99 °F (37 2 °C)   76  12  116/63    100 %    vent   11/16/19 0630  98 6 °F (37 °C)  80  12  116/63    100 %      11/16/19 0615  98 2 °F (36 8 °C)  94  16  117/67    100 %      11/16/19 0600  97 9 °F (36 6 °C)  78  12  117/67    100 %      11/16/19 0545  98 2 °F (36 8 °C)  80  12  113/58    100 %      11/16/19 0530  98 6 °F (37 °C)  82  12  115/56    100 %      11/16/19 0515  98 2 °F (36 8 °C)  86  16  128/67    100 %      11/16/19 0500    80        100 %     11/16/19 0445  96 8 °F (36 °C)Abnormal   72  20  127/75    100 %      11/16/19 0415  96 6 °F (35 9 °C)Abnormal   94  12  121/66    100 %      11/16/19 0354            100 %     11/16/19 0352    92  12  140/72    100 %      11/16/19 0350            100 %         Pertinent Labs/Diagnostic Test Results:   Results from last 7 days   Lab Units 11/16/19  0402   WBC Thousand/uL 6 43   HEMOGLOBIN g/dL 14 5   HEMATOCRIT % 44 3   PLATELETS Thousands/uL 282   NEUTROS ABS Thousands/µL 5 46     Results from last 7 days   Lab Units 11/16/19  0402   SODIUM mmol/L 142   POTASSIUM mmol/L 4 9   CHLORIDE mmol/L 109*   CO2 mmol/L 26   ANION GAP mmol/L 7   BUN mg/dL 12   CREATININE mg/dL 1 09   EGFR ml/min/1 73sq m 93   CALCIUM mg/dL 8 2*     Results from last 7 days   Lab Units 11/16/19  0718 11/16/19  0402   AST U/L  --  22   ALT U/L  --  29   ALK PHOS U/L  --  106   TOTAL PROTEIN g/dL  --  8 4*   ALBUMIN g/dL  --  4 7   TOTAL BILIRUBIN mg/dL  --  0 25   AMMONIA umol/L 41*  --      Results from last 7 days   Lab Units 11/16/19  0402   GLUCOSE RANDOM mg/dL 120     Results from last 7 days   Lab Units 11/16/19  0718 11/16/19  0507 11/16/19  0429   PH TAMARA  7 308 7 258* 7 220*   PCO2 TAMARA mm Hg 49 3 50 5* 60 3*   PO2 TAMARA mm Hg 56 7* 65 1* 49 3*   HCO3 TAMARA mmol/L 24 2 22 1* 24 1   BASE EXC TAMARA mmol/L -2 7 -5 5 -4 7   O2 CONTENT TAMARA ml/dL 18 5 19 4 16 2   O2 HGB, VENOUS % 84 6* 86 1* 73 9     Results from last 7 days   Lab Units 11/16/19  0449 11/16/19  0448   CLARITY UA  clear Slightly Cloudy   COLOR UA  yellow Yellow   SPEC GRAV UA   --  1 010   PH UA   --  6 0   GLUCOSE UA mg/dl  --  Negative   KETONES UA mg/dl  --  Negative   BLOOD UA   --  Negative   PROTEIN UA mg/dl  --  Negative   NITRITE UA   --  Negative   BILIRUBIN UA   --  Negative   UROBILINOGEN UA E U /dl  --  0 2 LEUKOCYTES UA   --  Negative     Results from last 7 days   Lab Units 11/16/19  0414   AMPH/METH  Negative   BARBITURATE UR  Negative   BENZODIAZEPINE UR  Positive*   COCAINE UR  Negative   METHADONE URINE  Negative   OPIATE UR  Negative   PCP UR  Negative   THC UR  Negative     Results from last 7 days   Lab Units 11/16/19  0402   ETHANOL LVL mg/dL 257*     ED Treatment:   Medication Administration from 11/16/2019 0341 to 11/16/2019 0700       Date/Time Order Dose Route Action     11/16/2019 0346 naloxone (NARCAN) 0 4 mg/mL injection **ADS Override Pull** 0 4 mg Intravenous Given     11/16/2019 0400 fentanyl citrate (PF) 100 MCG/2ML **ADS Override Pull** 50 mcg Intravenous Given     11/16/2019 0407 propofol (DIPRIVAN) 1000 mg in 100 mL infusion (premix) 30 mcg/kg/min Intravenous New Bag     11/16/2019 0443 fentaNYL 1000 mcg in sodium chloride 0 9% 100mL infusion 50 mcg/hr Intravenous New Bag     11/16/2019 0435 propofol (DIPRIVAN) 1000 mg in 100 mL infusion (premix) 30 mcg/kg/min Intravenous New Bag     Past Medical History:   Diagnosis Date    Asthma      Present on Admission:  **None**      Admitting Diagnosis: Alcohol intoxication ([facility] ) [F10 929]  Obtundation [R40 1]  Acute alcohol intoxication ([facility] ) [K88 506]  Metabolic acidosis with respiratory acidosis [E87 4]  Acute respiratory failure with hypercapnia (HCC) [J96 02]  Witnessed seizure-like activity ([facility] ) [R56 9]  Age/Sex: 30 y o  male  Admission Orders:  Scheduled Medications:  IP CONSULT TO CASE MANAGEMENT    Network Utilization Review Department  [email or web address]  ATTENTION: Please call with any questions or concerns to [phone] and carefully listen to the prompts so that you are directed to the right person   All voicemails are confidential   Mike Hallmark all requests for admission clinical reviews, approved or denied determinations and any other requests to dedicated fax number below belonging to the campus where the patient is receiving treatment    FACILITY NAME UR FAX NUMBER   ADMISSION DENIALS (Administrative/Medical Necessity) [address] [facility] (Maternity/NICU/Pediatrics) [phone]   [facility] [address] [facility] [address] [facility] [phone]   [address] [facility] [address] [facility] [phone]   Taylor Mariano [address] [phone]